# Patient Record
Sex: MALE | Race: AMERICAN INDIAN OR ALASKA NATIVE | URBAN - METROPOLITAN AREA
[De-identification: names, ages, dates, MRNs, and addresses within clinical notes are randomized per-mention and may not be internally consistent; named-entity substitution may affect disease eponyms.]

---

## 2018-09-26 ENCOUNTER — OFFICE VISIT (OUTPATIENT)
Dept: FAMILY MEDICINE | Facility: CLINIC | Age: 28
End: 2018-09-26
Payer: MEDICAID

## 2018-09-26 VITALS
HEIGHT: 73 IN | RESPIRATION RATE: 16 BRPM | TEMPERATURE: 98.1 F | BODY MASS INDEX: 30.35 KG/M2 | WEIGHT: 229 LBS | DIASTOLIC BLOOD PRESSURE: 84 MMHG | SYSTOLIC BLOOD PRESSURE: 134 MMHG | OXYGEN SATURATION: 97 % | HEART RATE: 94 BPM

## 2018-09-26 DIAGNOSIS — Z23 NEED FOR DIPHTHERIA-TETANUS-PERTUSSIS (TDAP) VACCINE: ICD-10-CM

## 2018-09-26 DIAGNOSIS — K92.1 BLOOD IN STOOL: ICD-10-CM

## 2018-09-26 DIAGNOSIS — F17.210 CIGARETTE NICOTINE DEPENDENCE WITHOUT COMPLICATION: ICD-10-CM

## 2018-09-26 DIAGNOSIS — Z23 NEED FOR INFLUENZA VACCINATION: ICD-10-CM

## 2018-09-26 DIAGNOSIS — K64.8 INTERNAL HEMORRHOID: ICD-10-CM

## 2018-09-26 DIAGNOSIS — M79.672 LEFT FOOT PAIN: ICD-10-CM

## 2018-09-26 DIAGNOSIS — Z00.00 ROUTINE GENERAL MEDICAL EXAMINATION AT A HEALTH CARE FACILITY: Primary | ICD-10-CM

## 2018-09-26 DIAGNOSIS — F19.10 SUBSTANCE ABUSE (H): ICD-10-CM

## 2018-09-26 ASSESSMENT — ANXIETY QUESTIONNAIRES
6. BECOMING EASILY ANNOYED OR IRRITABLE: SEVERAL DAYS
1. FEELING NERVOUS, ANXIOUS, OR ON EDGE: SEVERAL DAYS
GAD7 TOTAL SCORE: 7
2. NOT BEING ABLE TO STOP OR CONTROL WORRYING: SEVERAL DAYS
5. BEING SO RESTLESS THAT IT IS HARD TO SIT STILL: NOT AT ALL
7. FEELING AFRAID AS IF SOMETHING AWFUL MIGHT HAPPEN: MORE THAN HALF THE DAYS
IF YOU CHECKED OFF ANY PROBLEMS ON THIS QUESTIONNAIRE, HOW DIFFICULT HAVE THESE PROBLEMS MADE IT FOR YOU TO DO YOUR WORK, TAKE CARE OF THINGS AT HOME, OR GET ALONG WITH OTHER PEOPLE: VERY DIFFICULT
3. WORRYING TOO MUCH ABOUT DIFFERENT THINGS: SEVERAL DAYS

## 2018-09-26 ASSESSMENT — PATIENT HEALTH QUESTIONNAIRE - PHQ9: 5. POOR APPETITE OR OVEREATING: SEVERAL DAYS

## 2018-09-26 NOTE — PATIENT INSTRUCTIONS
1. Routine general medical examination at a health care facility  Declined routine STI screening, HIV, HCV and renal function/liver function/electrolytes while in clinic today. Referral for Rani Hernandez RN to schedule annual dental examination.   - TDAP VACCINE (BOOSTRIX)  - ADMIN VACCINE, INITIAL  - Pneumococcal vaccine 23 valent PPSV23  (Pneumovax) [[id number]]  - FLU VACCINE, 3 YRS +, IM  - ADMIN VACCINE, EACH ADDITIONAL    2. Cigarette nicotine dependence without complication  - nicotine polacrilex (CVS NICOTINE) 2 MG gum; Place 1 each (2 mg) inside cheek as needed for smoking cessation  Dispense: 30 tablet; Refill: 1  - Pneumococcal vaccine 23 valent PPSV23  (Pneumovax) [[id number]]    3. Substance abuse  Continue with sobriety care at [facility].     4. Left foot pain  - PODIATRY/FOOT & ANKLE SURGERY REFERRAL    5. Need for diphtheria-tetanus-pertussis (Tdap) vaccine  - TDAP VACCINE (BOOSTRIX)    6. Blood in stool  - Fecal colorectal cancer screen FITT; Future    7. Internal hemorrhoid  Increase both dietary fiber and water intake. Limit the time on the toilet to just when you need to be there; no sitting longer than needed. Can consider rectal medication to decrease inflammation of hemorrhoid and/or stool softener to help as well if needed.

## 2018-09-26 NOTE — MR AVS SNAPSHOT
After Visit Summary   9/26/2018    Carrington Johnson    MRN: [id number]           Patient Information     Date Of Birth          1990        Visit Information        Provider Department      9/26/2018 8:00 AM Rosmery Dumont APRN [facility]        Today's Diagnoses     Routine general medical examination at a health care facility    -  1    Cigarette nicotine dependence without complication        Substance abuse        Left foot pain        Need for diphtheria-tetanus-pertussis (Tdap) vaccine        Blood in stool        Internal hemorrhoid          Care Instructions    1. Routine general medical examination at a health care facility  Declined routine STI screening, HIV, HCV and renal function/liver function/electrolytes while in clinic today. Referral for Rani Hernandez RN to schedule annual dental examination.   - TDAP VACCINE (BOOSTRIX)  - ADMIN VACCINE, INITIAL  - Pneumococcal vaccine 23 valent PPSV23  (Pneumovax) [[id number]]  - FLU VACCINE, 3 YRS +, IM  - ADMIN VACCINE, EACH ADDITIONAL    2. Cigarette nicotine dependence without complication  - nicotine polacrilex (CVS NICOTINE) 2 MG gum; Place 1 each (2 mg) inside cheek as needed for smoking cessation  Dispense: 30 tablet; Refill: 1  - Pneumococcal vaccine 23 valent PPSV23  (Pneumovax) [[id number]]    3. Substance abuse  Continue with sobriety care at [facility].     4. Left foot pain  - PODIATRY/FOOT & ANKLE SURGERY REFERRAL    5. Need for diphtheria-tetanus-pertussis (Tdap) vaccine  - TDAP VACCINE (BOOSTRIX)    6. Blood in stool  - Fecal colorectal cancer screen FITT; Future    7. Internal hemorrhoid  Increase both dietary fiber and water intake. Limit the time on the toilet to just when you need to be there; no sitting longer than needed. Can consider rectal medication to decrease inflammation of hemorrhoid and/or stool softener to help as well if needed.             Follow-ups after your visit        Additional Services      PODIATRY/FOOT & ANKLE SURGERY REFERRAL       Your provider has referred you to: SHAHEED: [facility]: [address] [place] [id number] Patient Scheduling Line: [phone] option 1 (scheduling and directions)    Please be aware that coverage of these services is subject to the terms and limitations of your health insurance plan.  Call member services at your health plan with any benefit or coverage questions.      Please bring the following to your appointment:  >>   Any x-rays, CTs or MRIs which have been performed.  Contact the facility where they were done to arrange for  prior to your scheduled appointment.    >>   List of current medications   >>   This referral request   >>   Any documents/labs given to you for this referral                  Future tests that were ordered for you today     Open Future Orders        Priority Expected Expires Ordered    Fecal colorectal cancer screen FITT Routine 10/17/2018 2018 2018            Who to contact     Please call your clinic at [phone] to:    Ask questions about your health    Make or cancel appointments    Discuss your medicines    Learn about your test results    Speak to your doctor            Additional Information About Your Visit        [employer] Information     Clickatell is an electronic gateway that provides easy, online access to your medical records. With Clickatell, you can request a clinic appointment, read your test results, renew a prescription or communicate with your care team.     To sign up for [employer] visit the website at [email or web address]   You will be asked to enter the access code listed below, as well as some personal information. Please follow the directions to create your username and password.     Your access code is: [email or web address]  Expires: 2018  9:16 AM     Your access code will  in 90 days. If you need help or a new code, please contact your [facility] or call  "[phone] for assistance.        Care EveryWhere ID     This is your Care EveryWhere ID. This could be used by other organizations to access your [facility] medical records  [id number]        Your Vitals Were     Pulse Temperature Respirations Height Pulse Oximetry BMI (Body Mass Index)    94 98.1  F (36.7  C) (Oral) 16 6' 1\" (185.4 cm) 97% 30.21 kg/m2       Blood Pressure from Last 3 Encounters:   09/26/18 134/84    Weight from Last 3 Encounters:   09/26/18 229 lb (103.9 kg)              We Performed the Following     ADMIN VACCINE, EACH ADDITIONAL     ADMIN VACCINE, INITIAL     FLU VACCINE, 3 YRS +, IM     Pneumococcal vaccine 23 valent PPSV23  (Pneumovax) [[id number]]     PODIATRY/FOOT & ANKLE SURGERY REFERRAL     TDAP VACCINE (BOOSTRIX)          Today's Medication Changes          These changes are accurate as of 9/26/18  9:16 AM.  If you have any questions, ask your nurse or doctor.               Start taking these medicines.        Dose/Directions    nicotine polacrilex 2 MG gum   Commonly known as:  CVS NICOTINE   Used for:  Cigarette nicotine dependence without complication   Started by:  Rosmery Dumont APRN CNP        Dose:  2 mg   Place 1 each (2 mg) inside cheek as needed for smoking cessation   Quantity:  30 tablet   Refills:  1            Where to get your medicines      These medications were sent to [facility] [place] [facility] - [place], [place] - [address]  [facility] [address], [place] [address]     Phone:  [phone]     nicotine polacrilex 2 MG gum                Primary Care Provider    None Specified       No primary provider on file.        Equal Access to Services     REG[place] CALL AH: Shanice Chappell, bethany vyas, qaybta kasheron zhang. So [facility] [phone].    ATENCIÓN: Si habla español, tiene a hood disposición servicios gratuitos de asistencia lingüística. Llame al [phone].    We comply with " applicable federal civil rights laws and [place] laws. We do not discriminate on the basis of race, color, national origin, age, disability, sex, sexual orientation, or gender identity.            Thank you!     Thank you for choosing [facility]  for your care. Our goal is always to provide you with excellent care. Hearing back from our patients is one way we can continue to improve our services. Please take a few minutes to complete the written survey that you may receive in the mail after your visit with us. Thank you!             Your Updated Medication List - Protect others around you: Learn how to safely use, store and throw away your medicines at www.disposemymeds.org.          This list is accurate as of 9/26/18  9:16 AM.  Always use your most recent med list.                   Brand Name Dispense Instructions for use Diagnosis    nicotine polacrilex 2 MG gum    CVS NICOTINE    30 tablet    Place 1 each (2 mg) inside cheek as needed for smoking cessation    Cigarette nicotine dependence without complication

## 2018-09-26 NOTE — NURSING NOTE
"28 year old  Chief Complaint   Patient presents with     Physical     Pt. presents to the clinic today for a physical exam.        Blood pressure 134/84, pulse 94, temperature 98.1  F (36.7  C), temperature source Oral, resp. rate 16, height 6' 1\" (185.4 cm), weight 229 lb (103.9 kg), SpO2 97 %. Body mass index is 30.21 kg/(m^2).  BP completed using cuff size:    There is no problem list on file for this patient.      Wt Readings from Last 2 Encounters:   09/26/18 229 lb (103.9 kg)     BP Readings from Last 3 Encounters:   09/26/18 134/84       No Known Allergies    No current outpatient prescriptions on file.     No current facility-administered medications for this visit.        Social History   Substance Use Topics     Smoking status: Current Every Day Smoker     Types: Cigarettes     Smokeless tobacco: Never Used     Alcohol use No         Honoring Choices - Health Care Directive Guide offered to patient at time of visit.    Health Maintenance Due   Topic Date Due     PHQ-2 Q1 YR  02/04/2003     TETANUS IMMUNIZATION (SYSTEM ASSIGNED)  02/04/2009     HIV SCREEN (SYSTEM ASSIGNED)  02/04/2009     INFLUENZA VACCINE (1) 09/01/2018         There is no immunization history on file for this patient.    No results found for: PAP      No lab results found.    PHQ-2 ( 1999 Pfizer) 9/26/2018   Q1: Little interest or pleasure in doing things 0   Q2: Feeling down, depressed or hopeless 1   PHQ-2 Score 1       PHQ-9 SCORE 9/26/2018   Total Score 3       LIZA-7 SCORE 9/26/2018   Total Score 7     Screening Questionnaire for Adult Immunization    Are you sick today?   No   Do you have allergies to medications, food, a vaccine component or latex?   No   Have you ever had a serious reaction after receiving a vaccination?   No   Do you have a long-term health problem with heart disease, lung disease, asthma, kidney disease, metabolic disease (e.g. diabetes), anemia, or other blood disorder?   No   Do you have cancer, leukemia, " HIV/AIDS, or any other immune system problem?   No   In the past 3 months, have you taken medications that affect  your immune system, such as prednisone, other steroids, or anticancer drugs; drugs for the treatment of rheumatoid arthritis, Crohn s disease, or psoriasis; or have you had radiation treatments?   No   Have you had a seizure, or a brain or other nervous system problem?   No   During the past year, have you received a transfusion of blood or blood     products, or been given immune (gamma) globulin or antiviral drug?   No   For women: Are you pregnant or is there a chance you could become        pregnant during the next month?   No   Have you received any vaccinations in the past 4 weeks?   No     Immunization questionnaire answers were all negative.        Per orders of Rosmery Dumont NP, injection of Pneumococcal, Influenza, TDap given by Mirta Chacko. Patient instructed to remain in clinic for 15 minutes afterwards, and to report any adverse reaction to me immediately.       Screening performed by Mirta Chacko on 9/26/2018 at 9:44 AM.    No flowsheet data found.    Mirta Chacko CMA  September 26, 2018 8:14 AM
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
## 2018-09-27 ASSESSMENT — PATIENT HEALTH QUESTIONNAIRE - PHQ9: SUM OF ALL RESPONSES TO PHQ QUESTIONS 1-9: 3

## 2018-09-27 ASSESSMENT — ANXIETY QUESTIONNAIRES: GAD7 TOTAL SCORE: 7

## 2018-10-03 NOTE — TELEPHONE ENCOUNTER
FUTURE VISIT INFORMATION      FUTURE VISIT INFORMATION:    Date: 10/8/18    Time:     Location: [facility]  REFERRAL INFORMATION:    Referring provider:      Referring providers clinic:      Reason for visit/diagnosis  L foot pain, no previous surgery or outside records, appt per [facility]    RECORDS REQUESTED FROM:       Clinic name Comments Records Status Imaging Status    Treatment center referring n/a n/a                                   RECORDS STATUS

## 2018-10-08 ENCOUNTER — RADIANT APPOINTMENT (OUTPATIENT)
Dept: GENERAL RADIOLOGY | Facility: CLINIC | Age: 28
End: 2018-10-08
Attending: FAMILY MEDICINE
Payer: COMMERCIAL

## 2018-10-08 ENCOUNTER — PRE VISIT (OUTPATIENT)
Dept: ORTHOPEDICS | Facility: CLINIC | Age: 28
End: 2018-10-08

## 2018-10-08 ENCOUNTER — OFFICE VISIT (OUTPATIENT)
Dept: ORTHOPEDICS | Facility: CLINIC | Age: 28
End: 2018-10-08
Payer: COMMERCIAL

## 2018-10-08 VITALS — HEIGHT: 73 IN | WEIGHT: 229 LBS | RESPIRATION RATE: 16 BRPM | BODY MASS INDEX: 30.35 KG/M2

## 2018-10-08 DIAGNOSIS — M20.12 HALLUX VALGUS (ACQUIRED), LEFT FOOT: Primary | ICD-10-CM

## 2018-10-08 DIAGNOSIS — M79.672 LEFT FOOT PAIN: ICD-10-CM

## 2018-10-08 NOTE — LETTER
"  10/8/2018      RE: Carrington Johnson  [address] [facility] [address]        Subjective:   Carrington Johnson is a 28 year old male who presents with left medial foot pain. The pt reports a possibleinjury when he was 6 y/o. He believes a bunion like deformity occurred because he had no treatment at the time. Pain has been persistent.    He has a hx of heroin use which has been in remission since July 2017. He was incarcerated and then released and is now in inpatient drug treatment. He is attempting to take care of underlying issues so he can achieve employment when done with treatment.      Background:   Date of injury: None  Duration of symptoms: years intermittently  Mechanism of Injury: Chronic; Unknown   Intensity: 10/10 at the worst ; 6/10 at rest  Aggravating factors: Walking, running, jumping   Relieving Factors: Massage  Prior Evaluation: None    PAST MEDICAL, SOCIAL, SURGICAL AND FAMILY HISTORY: He  has a past medical history of Substance abuse (H).  He  has a past surgical history that includes wisdom teeth extraction.  His family history includes Allergy (Severe) in his son; Back Pain in his father; Breast Cancer in his maternal grandmother; Cancer in his paternal grandfather; Chronic Obstructive Pulmonary Disease in his paternal grandmother; Eczema in his son; No Known Problems in his brother, brother, maternal grandfather, mother, sister, and sister; Ovarian Cancer in his maternal grandmother.  He reports that he has been smoking Cigarettes.  He has never used smokeless tobacco. He reports that he uses illicit drugs, including Methamphetamines and Opiates. He reports that he does not drink alcohol.    ALLERGIES: He has No Known Allergies.    CURRENT MEDICATIONS: He has a current medication list which includes the following prescription(s): nicotine polacrilex.     REVIEW OF SYSTEMS: 3 point review of systems is negative except as noted above.     Exam:   Resp 16  Ht 6' 1\" (1.854 m)  Wt 229 lb " (103.9 kg)  BMI 30.21 kg/m2     CONSTITUTIONAL: healthy, alert and no distress  HEAD: Normocephalic. No masses, lesions, tenderness or abnormalities  SKIN: no suspicious lesions or rashes  GAIT: normal  NEUROLOGIC: Non-focal  PSYCHIATRIC: affect normal/bright and mentation appears normal.    MUSCULOSKELETAL:   L great toe   Hallux valgus deformity noted  Tender at the medial MTP, joint is reducible.  nontender at the sesamoids or midfoot. Nl sensation and cap refill    Xray Left foot consistent with hallux valgus deformity, no underlying bony lesion noted.     Assessment/Plan:   Left Hallux Valgus deformity and pain  --perhaps post-traumatic in childhood  --we discussed ice, nsiads, shoes with wide footbox  --rx for DME hallux valgus splint use  --given the length of time and symptoms, he is interested in talking to podiatry about this issue to know about potential surgical options now, or in the future.    Nick Ken MD CAQ      Nick Ken MD

## 2018-10-08 NOTE — PATIENT INSTRUCTIONS
Left Hallux valgus  Acetaminophen  Ice  Wide shoe  Trial a splint, if not improved f/u in podiatry to discuss surgical options.      Nick Ken MD CAQ

## 2018-10-08 NOTE — PROGRESS NOTES
" Subjective:   Carrington Johnson is a 28 year old male who presents with left medial foot pain. The pt reports a possibleinjury when he was 6 y/o. He believes a bunion like deformity occurred because he had no treatment at the time. Pain has been persistent.    He has a hx of heroin use which has been in remission since July 2017. He was incarcerated and then released and is now in inpatient drug treatment. He is attempting to take care of underlying issues so he can achieve employment when done with treatment.      Background:   Date of injury: None  Duration of symptoms: years intermittently  Mechanism of Injury: Chronic; Unknown   Intensity: 10/10 at the worst ; 6/10 at rest  Aggravating factors: Walking, running, jumping   Relieving Factors: Massage  Prior Evaluation: None    PAST MEDICAL, SOCIAL, SURGICAL AND FAMILY HISTORY: He  has a past medical history of Substance abuse (H).  He  has a past surgical history that includes wisdom teeth extraction.  His family history includes Allergy (Severe) in his son; Back Pain in his father; Breast Cancer in his maternal grandmother; Cancer in his paternal grandfather; Chronic Obstructive Pulmonary Disease in his paternal grandmother; Eczema in his son; No Known Problems in his brother, brother, maternal grandfather, mother, sister, and sister; Ovarian Cancer in his maternal grandmother.  He reports that he has been smoking Cigarettes.  He has never used smokeless tobacco. He reports that he uses illicit drugs, including Methamphetamines and Opiates. He reports that he does not drink alcohol.    ALLERGIES: He has No Known Allergies.    CURRENT MEDICATIONS: He has a current medication list which includes the following prescription(s): nicotine polacrilex.     REVIEW OF SYSTEMS: 3 point review of systems is negative except as noted above.     Exam:   Resp 16  Ht 6' 1\" (1.854 m)  Wt 229 lb (103.9 kg)  BMI 30.21 kg/m2     CONSTITUTIONAL: healthy, alert and no distress  HEAD: " Normocephalic. No masses, lesions, tenderness or abnormalities  SKIN: no suspicious lesions or rashes  GAIT: normal  NEUROLOGIC: Non-focal  PSYCHIATRIC: affect normal/bright and mentation appears normal.    MUSCULOSKELETAL:   L great toe   Hallux valgus deformity noted  Tender at the medial MTP, joint is reducible.  nontender at the sesamoids or midfoot. Nl sensation and cap refill    Xray Left foot consistent with hallux valgus deformity, no underlying bony lesion noted.     Assessment/Plan:   Left Hallux Valgus deformity and pain  --perhaps post-traumatic in childhood  --we discussed ice, nsiads, shoes with wide footbox  --rx for DME hallux valgus splint use  --given the length of time and symptoms, he is interested in talking to podiatry about this issue to know about potential surgical options now, or in the future.    Nick Ken MD CAQ

## 2018-10-08 NOTE — MR AVS SNAPSHOT
After Visit Summary   10/8/2018    Carrington Johnson    MRN: [id number]           Patient Information     Date Of Birth          1990        Visit Information        Provider Department      10/8/2018 3:45 PM Nick Ken MD [facility]        Today's Diagnoses     Hallux valgus (acquired), left foot    -  1    Left foot pain          Care Instructions    Left Hallux valgus  Acetaminophen  Ice  Wide shoe  Trial a splint, if not improved f/u in podiatry to discuss surgical options.      Nick Ken MD CAQ            Follow-ups after your visit        Additional Services     DME REFERRAL       Left Hallux Valgus splint    Please be aware that coverage of these services is subject to the terms and limitations of your health insurance plan.  Call member services at your health plan with any benefit or coverage questions.      Please bring the following to your appointment:  Any x-rays, CTs or MRIs which have been performed.  Contact the facility where they were done to arrange for  prior to your scheduled appointment.    List of current medications   This referral request   Any documents/labs given to you for this referral            PODIATRY/FOOT & ANKLE SURGERY REFERRAL       Your provider has referred you to:  Podiatry to discuss surgical options for Left Hallux Valgus     Please be aware that coverage of these services is subject to the terms and limitations of your health insurance plan.  Call member services at your health plan with any benefit or coverage questions.      Please bring the following to your appointment:  >>   Any x-rays, CTs or MRIs which have been performed.  Contact the facility where they were done to arrange for  prior to your scheduled appointment.    >>   List of current medications   >>   This referral request   >>   Any documents/labs given to you for this referral                  Your next 10 appointments already scheduled     Nov  "2018  4:20 PM CST   (Arrive by 4:05 PM)   NEW FOOT/ANKLE with Ryan Tinoco DPM   [facility] ([facility] [place])    [address] [address] [phone]              Who to contact     Please call your clinic at [phone] to:    Ask questions about your health    Make or cancel appointments    Discuss your medicines    Learn about your test results    Speak to your doctor            Additional Information About Your Visit        AeroDronhart Information     PeopleJar is an electronic gateway that provides easy, online access to your medical records. With PeopleJar, you can request a clinic appointment, read your test results, renew a prescription or communicate with your care team.     To sign up for PeopleJar visit the website at [email or web address]   You will be asked to enter the access code listed below, as well as some personal information. Please follow the directions to create your username and password.     Your access code is: [email or web address]  Expires: 2018  9:16 AM     Your access code will  in 90 days. If you need help or a new code, please contact your [facility] or call [phone] for assistance.        Care EveryWhere ID     This is your Care EveryWhere ID. This could be used by other organizations to access your [place] medical records  [id number]        Your Vitals Were     Respirations Height BMI (Body Mass Index)             16 6' 1\" (1.854 m) 30.21 kg/m2          Blood Pressure from Last 3 Encounters:   18 134/84    Weight from Last 3 Encounters:   10/08/18 229 lb (103.9 kg)   18 229 lb (103.9 kg)              We Performed the Following     DME REFERRAL     PODIATRY/FOOT & ANKLE SURGERY REFERRAL        Primary Care Provider    None Specified       No primary provider on file.        Equal Access to Services     FREDERICK CALL AH: Shanice Chappell, " bethany vyas, marah niidemi smith, sheron brizuela sallytl chaconaalubna ah. So [facility] [phone].    ATENCIÓN: Si dejala socrates, tiene a hood disposición servicios gratuitos de asistencia lingüística. Llame al [phone].    We comply with applicable federal civil rights laws and [place] laws. We do not discriminate on the basis of race, color, national origin, age, disability, sex, sexual orientation, or gender identity.            Thank you!     Thank you for choosing [facility]  for your care. Our goal is always to provide you with excellent care. Hearing back from our patients is one way we can continue to improve our services. Please take a few minutes to complete the written survey that you may receive in the mail after your visit with us. Thank you!             Your Updated Medication List - Protect others around you: Learn how to safely use, store and throw away your medicines at www.disposemymeds.org.          This list is accurate as of 10/8/18  4:27 PM.  Always use your most recent med list.                   Brand Name Dispense Instructions for use Diagnosis    nicotine polacrilex 2 MG gum    [facility] NICOTINE    30 tablet    Place 1 each (2 mg) inside cheek as needed for smoking cessation    Cigarette nicotine dependence without complication

## 2018-10-08 NOTE — LETTER
Date:October 9, 2018      Patient was self referred, no letter generated. Do not send.        [facility] Information

## 2018-11-01 NOTE — TELEPHONE ENCOUNTER
FUTURE VISIT INFORMATION      FUTURE VISIT INFORMATION:    Date: 11/13/18    Time: 1620    Location: ORTHO  REFERRAL INFORMATION:    Referring provider:  DR ORNELAS    Referring providers clinic:  [facility]    Reason for visit/diagnosis  L FOOT PAIN     RECORDS REQUESTED FROM:       Clinic name Comments Records Status Imaging Status                                         RECORDS IN CHART FROM [facility]

## 2018-11-13 ENCOUNTER — PRE VISIT (OUTPATIENT)
Dept: ORTHOPEDICS | Facility: CLINIC | Age: 28
End: 2018-11-13

## 2018-11-13 ENCOUNTER — OFFICE VISIT (OUTPATIENT)
Dept: ORTHOPEDICS | Facility: CLINIC | Age: 28
End: 2018-11-13
Payer: COMMERCIAL

## 2018-11-13 DIAGNOSIS — M79.672 LEFT FOOT PAIN: ICD-10-CM

## 2018-11-13 DIAGNOSIS — M20.12 HALLUX VALGUS OF LEFT FOOT: Primary | ICD-10-CM

## 2018-11-13 RX ORDER — IBUPROFEN 600 MG/1
600 TABLET, FILM COATED ORAL
COMMUNITY
Start: 2018-10-25

## 2018-11-13 RX ORDER — FLUTICASONE PROPIONATE 50 MCG
2 SPRAY, SUSPENSION (ML) NASAL
COMMUNITY
Start: 2018-10-25

## 2018-11-13 ASSESSMENT — ENCOUNTER SYMPTOMS
BACK PAIN: 1
NERVOUS/ANXIOUS: 1
DECREASED CONCENTRATION: 1
NECK PAIN: 1
DEPRESSION: 1

## 2018-11-13 NOTE — MR AVS SNAPSHOT
After Visit Summary   11/13/2018    Carrington Johnson    MRN: [id number]           Patient Information     Date Of Birth          1990        Visit Information        Provider Department      11/13/2018 4:20 PM Ryan Tinoco DPM [facility]        Today's Diagnoses     Hallux valgus of left foot    -  1    Left foot pain           Follow-ups after your visit        Additional Services     ORTHOPEDICS ADULT REFERRAL       Your provider has referred you to: [facility]: Orthopaedic Clinic [facility] [phone]   [email or web address]      Dr. Liz    Please be aware that coverage of these services is subject to the terms and limitations of your health insurance plan.  Call member services at your health plan with any benefit or coverage questions.      Please bring the following to your appointment:    >>   Any x-rays, CTs or MRIs which have been performed.  Contact the facility where they were done to arrange for  prior to your scheduled appointment.    >>   List of current medications   >>   This referral request   >>   Any documents/labs given to you for this referral                  Your next 10 appointments already scheduled     Dec 04, 2018  7:20 AM CST   (Arrive by 7:05 AM)   NEW FOOT/ANKLE with Calixto Liz MD   [facility] ([facility] [place])    [address] [place] [address] [phone]              Who to contact     Please call your clinic at [phone] to:    Ask questions about your health    Make or cancel appointments    Discuss your medicines    Learn about your test results    Speak to your doctor            Additional Information About Your Visit        MyChart Information     Sk[employer] is an electronic gateway that provides easy, online access to your medical records. With [employer], you can request a clinic appointment, read your test results, renew  a prescription or communicate with your care team.     To sign up for Heatmapshart visit the website at [email or web address]   You will be asked to enter the access code listed below, as well as some personal information. Please follow the directions to create your username and password.     Your access code is: [email or web address]  Expires: 2018  8:16 AM     Your access code will  in 90 days. If you need help or a new code, please contact your [facility] or call [phone] for assistance.        Care EveryWhere ID     This is your Care EveryWhere ID. This could be used by other organizations to access your [place] medical records  [id number]         Blood Pressure from Last 3 Encounters:   18 134/84    Weight from Last 3 Encounters:   10/08/18 103.9 kg (229 lb)   18 103.9 kg (229 lb)              We Performed the Following     ORTHOPEDICS ADULT REFERRAL        Primary Care Provider    None Specified       No primary provider on file.        Equal Access to Services     [facility]: Hadii aad ku hadasho Soomaali, waaxda luqadaha, qaybta kaalmada adeegyada, sheron li . So [facility] [phone].    ATENCIÓN: Si habla español, tiene a hood disposición servicios gratuitos de asistencia lingüística. Llame al [phone].    We comply with applicable federal civil rights laws and [place] laws. We do not discriminate on the basis of race, color, national origin, age, disability, sex, sexual orientation, or gender identity.            Thank you!     Thank you for choosing [facility]  for your care. Our goal is always to provide you with excellent care. Hearing back from our patients is one way we can continue to improve our services. Please take a few minutes to complete the written survey that you may receive in the mail after your visit with us. Thank you!             Your Updated Medication List - Protect others around  you: Learn how to safely use, store and throw away your medicines at www.disposemymeds.org.          This list is accurate as of 11/13/18 11:59 PM.  Always use your most recent med list.                   Brand Name Dispense Instructions for use Diagnosis    fluticasone 50 MCG/ACT spray    FLONASE     Spray 2 sprays in nostril        ibuprofen 600 MG tablet    ADVIL/MOTRIN     Take 600 mg by mouth        nicotine polacrilex 2 MG gum    CVS NICOTINE    30 tablet    Place 1 each (2 mg) inside cheek as needed for smoking cessation    Cigarette nicotine dependence without complication

## 2018-11-13 NOTE — NURSING NOTE
Reason For Visit:   Chief Complaint   Patient presents with     RECHECK     Pain, left foot. Patient stated that his foot has been like this since he was 5 years old.        Referring:   DAVIDE ORNELAS    Pain Assessment  Patient Currently in Pain: Denies  Primary Pain Location: Foot  Pain Orientation: Left  Pain Descriptors:  (intermittently flares up. )               Current Outpatient Prescriptions   Medication Sig Dispense Refill     fluticasone (FLONASE) 50 MCG/ACT spray Spray 2 sprays in nostril       ibuprofen (ADVIL/MOTRIN) 600 MG tablet Take 600 mg by mouth       nicotine polacrilex ([facility]S NICOTINE) 2 MG gum Place 1 each (2 mg) inside cheek as needed for smoking cessation (Patient not taking: Reported on 10/8/2018) 30 tablet 1        No Known Allergies

## 2018-11-13 NOTE — LETTER
11/13/2018       RE: Carrington Johnson  [address] [facility] [address]     Dear Colleague,    Thank you for referring your patient, Carrington Johnson, [facility] the [facility]. Please see a copy of my visit note below.      Date of Service: 11/13/2018    Chief Complaint:   Chief Complaint   Patient presents with     RECHECK     Pain, left foot. Patient stated that his foot has been like this since he was 5 years old.         HPI: Carrington is a 28 year old male who presents today for further evaluation of left foot bunion.  Nature: sharp/dull/aching    Location: left foot bunion    Duration: since he was 5. Pain for the last year or so.     Onset: thinks this started after trauma to the foot at age 5. Had a door shut on it.     Course: worsening    Aggravating/alleviating factors: walking and shoes aggravate. Rest alleviates.    Previous Treatments: None      Review of Systems: Answers for HPI/ROS submitted by the patient on 11/13/2018   General Symptoms: No  Skin Symptoms: No  HENT Symptoms: Yes  EYE SYMPTOMS: No  HEART SYMPTOMS: No  LUNG SYMPTOMS: No  INTESTINAL SYMPTOMS: No  URINARY SYMPTOMS: No  REPRODUCTIVE SYMPTOMS: No  SKELETAL SYMPTOMS: Yes  BLOOD SYMPTOMS: No  NERVOUS SYSTEM SYMPTOMS: No  MENTAL HEALTH SYMPTOMS: Yes  Ear pain: Yes  Tinnitus: Yes  Tooth pain: Yes  Back pain: Yes  Neck pain: Yes  Nervous or Anxious: Yes  Depression: Yes  Trouble thinking or concentrating: Yes  Mood changes: Yes      PMH:   Past Medical History:   Diagnosis Date     Substance abuse (H)        PSxH:   Past Surgical History:   Procedure Laterality Date     wisdom teeth extraction         Allergies: Review of patient's allergies indicates no known allergies.    SH:   Social History     Social History     Marital status: Single     Spouse name: N/A     Number of children: N/A     Years of education: N/A     Occupational History     Not on file.     Social History Main  Topics     Smoking status: Current Every Day Smoker     Types: Cigarettes     Smokeless tobacco: Never Used     Alcohol use No     Drug use: Yes     Special: Methamphetamines, Opiates      Comment: clean since 7/11/2017     Sexual activity: Not Currently     Partners: Female     Other Topics Concern     Not on file     Social History Narrative    Has a son (23 months), and another son who is 6 years old. Has 5 step-children as well. Is [facility]. Is not planning on returning after discharge from [facility]. [place] is planning on moving down to [facility] once Carrington transitions to sober housing.        FH:   Family History   Problem Relation Age of Onset     No Known Problems Mother      Back Pain Father      slipped discs     No Known Problems Sister      No Known Problems Brother      not in contact with oldest sibling     Breast Cancer Maternal Grandmother      Ovarian Cancer Maternal Grandmother      No Known Problems Maternal Grandfather      Chronic Obstructive Pulmonary Disease Paternal Grandmother      Cancer Paternal Grandfather      No Known Problems Sister      No Known Problems Brother      Allergy (Severe) Son      Peanut allergy     Eczema Son        Objective:  Data Unavailable Data Unavailable Data Unavailable Data Unavailable Data Unavailable 0 lbs 0 oz    PT and DP pulses are 2/4 bilaterally. CRT is instant. Positive pedal hair.   Gross sensation is intact bilaterally.   Equinus is mild bilaterally. Severe HAV deformity noted on the left foot that is tracking and on the verge of trackbound. Irritation erythema noted to the medial prominence with associated bursa noted. No over or underriding of the 2nd digit.    Nails normal bilaterally. No open lesions are noted.     left foot xrays indicated in 3 weightbearing views.    hallux valgus deformity with JOANA of 18 and HAA of 48. Tibial sesamoid position at 7.         Assessment: Severe HAV on  the left foot causing pain.     Plan:  - Pt seen and evaluated  - Xrays taken and discussed with him.  - Recommend that he try a bunion sleeve on thwe area for some conservative padding. However, I think he is likely to fail conservative tx due to the extent of the deformity. As such, he would like to speak to Dr. Liz about surgical correction. Referral was written.  - See again PRN.              Again, thank you for allowing me to participate in the care of your patient.      Sincerely,    Ryan Tionco DPM

## 2018-11-13 NOTE — PROGRESS NOTES
Date of Service: 11/13/2018    Chief Complaint:   Chief Complaint   Patient presents with     RECHECK     Pain, left foot. Patient stated that his foot has been like this since he was 5 years old.         HPI: Carrington is a 28 year old male who presents today for further evaluation of left foot bunion.  Nature: sharp/dull/aching    Location: left foot bunion    Duration: since he was 5. Pain for the last year or so.     Onset: thinks this started after trauma to the foot at age 5. Had a door shut on it.     Course: worsening    Aggravating/alleviating factors: walking and shoes aggravate. Rest alleviates.    Previous Treatments: None      Review of Systems: Answers for HPI/ROS submitted by the patient on 11/13/2018   General Symptoms: No  Skin Symptoms: No  HENT Symptoms: Yes  EYE SYMPTOMS: No  HEART SYMPTOMS: No  LUNG SYMPTOMS: No  INTESTINAL SYMPTOMS: No  URINARY SYMPTOMS: No  REPRODUCTIVE SYMPTOMS: No  SKELETAL SYMPTOMS: Yes  BLOOD SYMPTOMS: No  NERVOUS SYSTEM SYMPTOMS: No  MENTAL HEALTH SYMPTOMS: Yes  Ear pain: Yes  Tinnitus: Yes  Tooth pain: Yes  Back pain: Yes  Neck pain: Yes  Nervous or Anxious: Yes  Depression: Yes  Trouble thinking or concentrating: Yes  Mood changes: Yes      PMH:   Past Medical History:   Diagnosis Date     Substance abuse (H)        PSxH:   Past Surgical History:   Procedure Laterality Date     wisdom teeth extraction         Allergies: Review of patient's allergies indicates no known allergies.    SH:   Social History     Social History     Marital status: Single     Spouse name: N/A     Number of children: N/A     Years of education: N/A     Occupational History     Not on file.     Social History Main Topics     Smoking status: Current Every Day Smoker     Types: Cigarettes     Smokeless tobacco: Never Used     Alcohol use No     Drug use: Yes     Special: Methamphetamines, Opiates      Comment: clean since 7/11/2017     Sexual activity: Not Currently     Partners: Female     Other  Topics Concern     Not on file     Social History Narrative    Has a son (23 months), and another son who is 6 years old. Has 5 step-children as well. Is from [place]. Is not planning on returning after discharge from [facility]. [place] is planning on moving down to [place] once Carrington transitions to sober housing.        FH:   Family History   Problem Relation Age of Onset     No Known Problems Mother      Back Pain Father      slipped discs     No Known Problems Sister      No Known Problems Brother      not in contact with oldest sibling     Breast Cancer Maternal Grandmother      Ovarian Cancer Maternal Grandmother      No Known Problems Maternal Grandfather      Chronic Obstructive Pulmonary Disease Paternal Grandmother      Cancer Paternal Grandfather      No Known Problems Sister      No Known Problems Brother      Allergy (Severe) Son      Peanut allergy     Eczema Son        Objective:  Data Unavailable Data Unavailable Data Unavailable Data Unavailable Data Unavailable 0 lbs 0 oz    PT and DP pulses are 2/4 bilaterally. CRT is instant. Positive pedal hair.   Gross sensation is intact bilaterally.   Equinus is mild bilaterally. Severe HAV deformity noted on the left foot that is tracking and on the verge of trackbound. Irritation erythema noted to the medial prominence with associated bursa noted. No over or underriding of the 2nd digit.    Nails normal bilaterally. No open lesions are noted.     left foot xrays indicated in 3 weightbearing views.    hallux valgus deformity with JOANA of 18 and HAA of 48. Tibial sesamoid position at 7.         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     Plan:  - Pt seen and evaluated  - Xrays taken and discussed with him.  - Recommend that he try a bunion sleeve on we area for some conservative padding. However, I think he is likely to fail conservative tx due to the extent of the deformity. As such, he  would like to speak to Dr. Liz about surgical correction. Referral was written.  - See again PRN.

## 2018-11-15 PROBLEM — M20.12 HALLUX VALGUS OF LEFT FOOT: Status: ACTIVE | Noted: 2018-11-15

## 2018-11-15 PROBLEM — M79.672 LEFT FOOT PAIN: Status: ACTIVE | Noted: 2018-11-15

## 2018-11-28 NOTE — TELEPHONE ENCOUNTER
FUTURE VISIT INFORMATION      FUTURE VISIT INFORMATION:    Date: 12/4/18    Time: 0720    Location: [facility]  REFERRAL INFORMATION:    Referring provider:  DR RIOS    Referring providers clinic:  ORTHO    Reason for visit/diagnosis  HALLUX VALGUS    RECORDS REQUESTED FROM:       Clinic name Comments Records Status Imaging Status                                         RECORDS IN CHART

## 2018-12-04 ENCOUNTER — OFFICE VISIT (OUTPATIENT)
Dept: ORTHOPEDICS | Facility: CLINIC | Age: 28
End: 2018-12-04
Payer: COMMERCIAL

## 2018-12-04 ENCOUNTER — PRE VISIT (OUTPATIENT)
Dept: ORTHOPEDICS | Facility: CLINIC | Age: 28
End: 2018-12-04

## 2018-12-04 VITALS
BODY MASS INDEX: 32.21 KG/M2 | WEIGHT: 251 LBS | SYSTOLIC BLOOD PRESSURE: 132 MMHG | DIASTOLIC BLOOD PRESSURE: 87 MMHG | HEIGHT: 74 IN

## 2018-12-04 VITALS — BODY MASS INDEX: 32.33 KG/M2 | WEIGHT: 251.9 LBS | HEIGHT: 74 IN

## 2018-12-04 DIAGNOSIS — M54.2 CERVICALGIA: Primary | ICD-10-CM

## 2018-12-04 DIAGNOSIS — M25.572 PAIN IN JOINT, ANKLE AND FOOT, LEFT: Primary | ICD-10-CM

## 2018-12-04 ASSESSMENT — ENCOUNTER SYMPTOMS
CONSTIPATION: 0
DISTURBANCES IN COORDINATION: 0
BOWEL INCONTINENCE: 0
NUMBNESS: 0
JOINT SWELLING: 0
MEMORY LOSS: 0
SPEECH CHANGE: 0
NERVOUS/ANXIOUS: 1
SORE THROAT: 0
MUSCLE CRAMPS: 0
TROUBLE SWALLOWING: 0
TASTE DISTURBANCE: 0
INSOMNIA: 0
BACK PAIN: 1
MYALGIAS: 0
NAUSEA: 0
LOSS OF CONSCIOUSNESS: 0
SINUS PAIN: 0
PARALYSIS: 0
WEAKNESS: 0
PANIC: 0
JAUNDICE: 0
MUSCLE WEAKNESS: 0
DECREASED CONCENTRATION: 1
DEPRESSION: 1
ABDOMINAL PAIN: 0
SMELL DISTURBANCE: 0
BLOOD IN STOOL: 1
NECK MASS: 0
VOMITING: 0
TINGLING: 0
STIFFNESS: 0
SINUS CONGESTION: 0
NECK PAIN: 1
RECTAL PAIN: 0
SEIZURES: 0
TREMORS: 0
HOARSE VOICE: 0
BLOATING: 0
DIZZINESS: 0
HEADACHES: 1
HEARTBURN: 0
ARTHRALGIAS: 0
DIARRHEA: 0

## 2018-12-04 NOTE — MR AVS SNAPSHOT
After Visit Summary   12/4/2018    Carrington Johnson    MRN: [id number]           Patient Information     Date Of Birth          1990        Visit Information        Provider Department      12/4/2018 7:40 AM Erlin Richards [facility]        Today's Diagnoses     Cervicalgia    -  1      Care Instructions    CERVICAL (Neck) STRAIN    WHAT IS NECK STRAIN?    A neck strain is a stretch or tear of a muscle in your neck.    WHAT IS THE CAUSE?    Neck strains usually happen when the head and neck are hit or forcibly moved, such as during rough contact sports or a whiplash injury (like a car accident or a fall) where your head and neck are snapped back and forth. Sometimes strains happen from an awkward position during sleep or poor posture while you work at a desk.    WHAT ARE THE SYMPTOMS?    The main symptom is pain in your neck. You may have pain when you move your head to the side or when you try to move your head up or down. The neck muscles may tighten (spasm) and feel hard and very tender to the touch. You may feel pain right after an injury or not until a few hours or days after the injury.    Other symptoms may include:    Stiff neck  Dizziness  Unusual feelings, like burning or a pins-and-needles feeling  Headache    HOW IS IT DIAGNOSED?    Your healthcare provider will ask about your symptoms, medical history, and activities and examine your neck. You may have X-rays to make sure the bones in your neck (vertebrae) are not injured.    HOW IS IT TREATED?    Your healthcare provider may recommend stretching and strengthening exercises and other types of physical therapy to help you heal.    The pain often gets better within a few weeks with self-care, but some injuries may take longer to heal. It s important to follow all of your healthcare provider s instructions.    HOW CAN I HELP TAKE CARE OF MYSELF?    To help relieve pain:    Put an ice pack, gel pack, or  package of frozen vegetables wrapped in a cloth on the injured area every 3 to 4 hours for up to 20 minutes at a time for the first day or two after the injury.    Take nonprescription pain medicine, such as acetaminophen, ibuprofen, or naproxen. Read the label and take as directed. Nonsteroidal anti-inflammatory medicines (NSAIDs), such as ibuprofen or naproxen, may cause stomach bleeding and other problems. These risks increase with age. Unless recommended by your healthcare provider, do not take an NSAID for more than 10 days.    Moist heat may help relax your muscles and make it easier to move your neck. Put moist heat on the sore area for 10 to 15 minutes before you do warm-up and stretching exercises. Moist heat includes heat patches or moist heating pads that you can purchase at most drugstores, a wet washcloth or towel that has been heated in a microwave or the dryer, or a hot shower. Don t use heat if you have swelling.    You may find that it helps to alternate putting heat and ice on your neck.    HOW CAN I HELP PREVENT NECK STRAIN?    Neck strain may be prevented by keeping your neck muscles strong and flexible. Ask your healthcare provider about stretching and exercises that may help. If you have a job or hobby that requires you to hold your neck in one position for long hours, like working at a computer all day, it s very important to take breaks and stretch your neck muscles.    Developed by [employer].  Published by [employer].  Copyright  2014 [employer] and/or one of its subsidiaries. All rights reserved.      Myofascial Release    Tennis balls can provide myofascial release through myofascial therapy. You can perform self-myofascial release by placing a tennis ball on the tender area and leaning against a wall or the floor to apply light pressure until you feel the release. Or you can put two tennis balls in a sock, leaving a small space between the balls, and tying the open end  closed. Lie on the floor and put the tennis balls behind your neck, one on either side of your spine. Press your neck against the tennis balls until you feel the release. Do not press hard enough to cause pain.    Neck Spasm Exercises    You may do all of these exercises right away but avoid any movements that increase your pain.    Neck rotation with flexion  Right side: Turn your head to the right and clasp your hands behind your head. Let the weight of your arms pull your chin to the right side of your chest. Relax. Hold for a count of 15. Do this 3 times.    Left side: Turn your head to the left and clasp your hands behind your head. Let the weight of your arms pull your chin to the left side of your chest. Relax. Hold for a count of 15. Do this 3 times.    Chin tuck: Place your fingertips on your chin and gently push your head straight back as if you are trying to make a double chin. Keep looking forward as your head moves back. Hold 5 seconds and repeat 5 times.  Scalene stretch: Sit or stand and clasp both hands behind your back. Lower your left shoulder and tilt your head toward the right until you feel a stretch. Hold this position for 15 to 30 seconds and then come back to the starting position. Then lower your right shoulder and tilt your head toward the left. Hold for 15 to 30 seconds. Repeat 3 times on each side.  Neck rotation stretch    Right side: Rotate your neck by looking over your right shoulder. Lift your right hand and place your palm on the left side of your chin. Push your chin with your palm toward your right shoulder. Hold for a count of 10. Do this 3 times.    Left side: Rotate your neck by looking over your left shoulder. Lift your left hand and place your palm on the right side of your chin. Push your chin with your palm toward your left shoulder. Hold for a count of 10. Do this 3 times.    Scapular squeeze: While sitting or standing with your arms by your sides, squeeze your shoulder  "blades together and hold for 5 seconds. Do 2 sets of 15.  Thoracic extension: Sit in a chair and clasp both arms behind your head. Gently arch backward and look up toward the ceiling. Repeat 10 times. Do this several times each day.    Head lift with neck curl: Lie on your back with your knees bent and your feet flat on the floor. Tuck your chin and lift your head about 3 inches off the floor, keeping your shoulders flat on the floor. Hold for 10 seconds. Repeat 5 times. Try to work up to holding for 20 to 30 seconds.              Follow-ups after your visit        Additional Services     PHYSICAL THERAPY REFERRAL (Internal)       Physical Therapy Referral                  Future tests that were ordered for you today     Open Future Orders        Priority Expected Expires Ordered    PHYSICAL THERAPY REFERRAL (Internal) Routine  12/4/2019 12/4/2018            Who to contact     Please call your clinic at [phone] to:    Ask questions about your health    Make or cancel appointments    Discuss your medicines    Learn about your test results    Speak to your doctor            Additional Information About Your Visit        Care EveryWhere ID     This is your Care EveryWhere ID. This could be used by other organizations to access your [facility] medical records  [id number]        Your Vitals Were     Height BMI (Body Mass Index)                1.87 m (6' 1.62\") 32.56 kg/m2           Blood Pressure from Last 3 Encounters:   12/04/18 132/87   09/26/18 134/84    Weight from Last 3 Encounters:   12/04/18 113.9 kg (251 lb)   12/04/18 114.3 kg (251 lb 14.4 oz)   10/08/18 103.9 kg (229 lb)               Primary Care Provider    None Specified       No primary provider on file.        Equal Access to Services     REGINA CALL : bethany Russ, sheron babb. So [facility] [phone].    ATENCIÓN: Si habla español, tiene a hood disposición " servicios gratuitos de asistencia lingüística. Mickie berry [phone].    We comply with applicable federal civil rights laws and [place] laws. We do not discriminate on the basis of race, color, national origin, age, disability, sex, sexual orientation, or gender identity.            Thank you!     Thank you for choosing [facility]  for your care. Our goal is always to provide you with excellent care. Hearing back from our patients is one way we can continue to improve our services. Please take a few minutes to complete the written survey that you may receive in the mail after your visit with us. Thank you!             Your Updated Medication List - Protect others around you: Learn how to safely use, store and throw away your medicines at www.disposemymeds.org.          This list is accurate as of 12/4/18  8:10 AM.  Always use your most recent med list.                   Brand Name Dispense Instructions for use Diagnosis    fluticasone 50 MCG/ACT nasal spray    FLONASE     Spray 2 sprays in nostril        ibuprofen 600 MG tablet    ADVIL/MOTRIN     Take 600 mg by mouth        nicotine 2 MG gum    [employer] NICOTINE    30 tablet    Place 1 each (2 mg) inside cheek as needed for smoking cessation    Cigarette nicotine dependence without complication

## 2018-12-04 NOTE — NURSING NOTE
"Reason For Visit:   Chief Complaint   Patient presents with     Consult     Hallux valgus Left foot       Ht 1.87 m (6' 1.62\")  Wt 114.3 kg (251 lb 14.4 oz)  BMI 32.68 kg/m2    Pain Assessment  Patient Currently in Pain: Jermaineies    Renetta Holley ATC    "

## 2018-12-04 NOTE — LETTER
12/4/2018       RE: Carrington Johnson  [address] [address]     Dear Colleague,    Thank you for referring your patient, Carrington Johnson, to the [facility] CLINIC at [facility]. Please see a copy of my visit note below.          SPORTS & ORTHOPEDIC WALK-IN VISIT 12/4/2018    Primary Care Physician:      Low back pain and neck / trap pain. He notes that he has pain in his back due to sitting a lot. His shoulder/trap pain is more intense than his low back pain. Pain starts in the base of the skull into his shoulders bilaterally. He notes he was in [facility] time for about year and got out in September. He has pain pretty constantly and gets more intense with certain movements. Stretching and ibuprofen helps pain.     Reason for visit:     What part of your body is injured / painful? Neck pain/trap pain    What caused the injury /pain? No inciting event     How long ago did your injury occur or pain begin? several months ago    What are your most bothersome symptoms? Pain    How would you characterize your symptom?  aching and tight     What makes your symptoms better? Rest and Ibuprofen    What makes your symptoms worse? Movement    Have you been previously seen for this problem? No    Medical History:    Any recent changes to your medical history? No    Any new medication prescribed since last visit? No    Have you had surgery on this body part before? No    Social History:    Occupation:     Handedness: Right    Exercise: None    Review of Systems:    Do you have fever, chills, weight loss? No    Do you have any vision problems? No    Do you have any chest pain or edema? No    Do you have any shortness of breath or wheezing?  No    Do you have stomach problems? No    Do you have any numbness or focal weakness? No    Do you have diabetes? No    Do you have problems with bleeding or clotting? No    Do you have an rashes or other skin  lesions? No           CHIEF COMPLAINT:  Neck pain     HISTORY OF PRESENT ILLNESS   is a pleasant 28 year old year old male who presents to clinic today with shoulder and neck pain.  Carrington explains that he was incarcerated over the past year and believes his firm mattress and prolonged sitting has contributed to neck and back pain.  Low back pain as well but this is not nearly as bothersome.  Would like to discuss cervical pain as priority today.    Denies numbness or tingling regularly but does endorse tingling from elbow to hand with use of phone for prolonged periods; elbow bent.  Unsure of fingers involved.    Onset: gradual  Location: bilateral shoulders and neck  Quality:  aching and tight  Duration: 4 months   Severity: moderate/10 at worst  Timing:intermittent episodes  Modifying factors:  resting and non-use makes it better, movement and use makes it worse  Associated signs & symptoms: None  Previous similar pain: No  Treatments to date: Rest and ibuprofen    Additional history: as documented    MEDICAL HISTORY  Patient Active Problem List   Diagnosis     Hallux valgus of left foot     Left foot pain       Current Outpatient Prescriptions   Medication Sig Dispense Refill     fluticasone (FLONASE) 50 MCG/ACT spray Spray 2 sprays in nostril       ibuprofen (ADVIL/MOTRIN) 600 MG tablet Take 600 mg by mouth       nicotine polacrilex ([employer] NICOTINE) 2 MG gum Place 1 each (2 mg) inside cheek as needed for smoking cessation (Patient not taking: Reported on 10/8/2018) 30 tablet 1       No Known Allergies    Family History   Problem Relation Age of Onset     No Known Problems Mother      Back Pain Father      slipped discs     No Known Problems Sister      No Known Problems Brother      not in contact with oldest sibling     Breast Cancer Maternal Grandmother      Ovarian Cancer Maternal Grandmother      No Known Problems Maternal Grandfather      Chronic Obstructive Pulmonary Disease Paternal Grandmother   "    Cancer Paternal Grandfather      No Known Problems Sister      No Known Problems Brother      Allergy (Severe) Son      Peanut allergy     Eczema Son        Additional medical/Social/Surgical histories reviewed in [facility] and updated as appropriate.     REVIEW OF SYSTEMS (12/4/2018)  CONSTITUTIONAL: Denies fever and weight loss  EYES: Denies acute vision changes  ENT: Denies hearing changes or difficulty swallowing  CARDIAC: Denies chest pain or edema  RESPIRATORY: Denies dyspnea, cough or wheeze  GASTROINTESTINAL: Denies abdominal pain, nausea, vomiting  MUSCULOSKELETAL: See HPI  SKIN: Denies any recent rash or lesion  NEUROLOGICAL: Denies numbness or focal weakness  PSYCHIATRIC: No history of psychiatric symptoms or problems  ENDOCRINE: Diagnosis of diabetes:No  HEMATOLOGY: Denies episodes of easy bleeding      PHYSICAL EXAM  /87  Ht 1.87 m (6' 1.62\")  Wt 113.9 kg (251 lb)  BMI 32.56 kg/m2    General  - normal appearance, in no obvious distress  CV  - normal peripheral perfusion  Pulm  - normal respiratory pattern, non-labored  Musculoskeletal - cervical spine  - inspection: normal bone and joint alignment, no obvious kyphosis  - palpation: paravertebral tenderness to palpation bilaterally, scalene tenderness, bilateral trapezius tenderness  - ROM: normal and painless flexion, mildly limited extension, left rotation and left side bending mildly limited. Right rotation minimal limitation.   - strength: upper extremities 5/5 in all planes  - special tests:  (-) Spurling bilaterally  (-) Tomi's reflex  Neuro  - C5-7 DTRs 2+ bilaterally, no sensory or motor deficit, grossly normal coordination, normal muscle tone  Skin  - no ecchymosis, erythema, warmth, or induration, no obvious rash  Psych  - interactive, appropriate, normal mood and affect    IMAGING : Deferred today     ASSESSMENT & PLAN   is a 28 year old year old male who presents to clinic today with persisting cervicalgia and shoulder " pain over the last 3-4 months.    Diagnosis: Acute cervicalgia    -Physical therapy referral  -Home exercise program  -Heat discussed  -Tennis ball massages  -Follow up 6 weeks    It was a pleasure seeing Carrington today.    Erlin Richards DO, [facility]  Primary Care Sports Medicine      Again, thank you for allowing me to participate in the care of your patient.      Sincerely,    Erlin Richards DO

## 2018-12-04 NOTE — PROGRESS NOTES
CHIEF COMPLAINT:  Left foot pain.      HISTORY OF PRESENT ILLNESS:   is a 28-year-old male who presents today for evaluation of his left foot.  The patient reports to have had some trauma to his left foot at the age of 5 where he apparently was hit by a door.  Eventually because of lack of insurance from his dad he reports to have treated this conservatively.  Over the course of the following years, he has developed a valgus deformity and now reports to have pain and discomfort.      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      He reports to have pain and discomfort along the medial aspect of the first MTP joint.  Reports also to have tried some shoe modifications without any significant success.      PAST MEDICAL HISTORY:  Back pain and substance abuse.      DRUG ALLERGIES:  None.      CURRENT MEDICATIONS:  Please refer to encounter form.      PHYSICAL EXAMINATION:  On today's visit, he presents as a pleasant male in no apparent distress with a height of 6 feet 1 inch and a weight of 251 pounds.  Denies to have any constitutional symptoms.      On today's visit, he presents with full range of motion of the left ankle, hindfoot and midfoot joints.  CMS intact.  Skin intact.  Presents with a semi-correctable bunion deformity with the absence of a hypermobile first ray.  There is some skin irritation along the medial aspect of the first metatarsal head.  Motion of the first MTP joint is approximately a combined 40 degrees of plantar and dorsiflexion.      RADIOGRAPHIC EVALUATION:  Plain x-rays were reviewed today which were significant for showing a noncongruent bunion deformity with a relatively well-preserved joint space.  Otherwise, x-rays are unremarkable.      ASSESSMENT:  Posttraumatic bunion deformity, left foot.      PLAN:  I discussed with patient that at this point the options  are to proceed with a first metatarsal osteotomy versus a first MTP joint fusion.  Given the history that he presents, I would suggest to proceed with a fusion as I believe he will be a much more predictable procedure with a very good functional outcome.      I discussed with the patient the pros and cons of the procedure as well as the most likely postoperative course.  For the time being, he would like to do some thinking and he will contact us if he wishes to proceed with surgery, which will require another visit in the clinic.      On today's visit, also we made arrangements for him to have his back evaluated by our walk-in clinic providers.      All questions were answered.  The patient was pleased with the discussion.      TT 30 minutes, CT 20 minutes.

## 2018-12-04 NOTE — PATIENT INSTRUCTIONS
CERVICAL (Neck) STRAIN    WHAT IS NECK STRAIN?    A neck strain is a stretch or tear of a muscle in your neck.    WHAT IS THE CAUSE?    Neck strains usually happen when the head and neck are hit or forcibly moved, such as during rough contact sports or a whiplash injury (like a car accident or a fall) where your head and neck are snapped back and forth. Sometimes strains happen from an awkward position during sleep or poor posture while you work at a desk.    WHAT ARE THE SYMPTOMS?    The main symptom is pain in your neck. You may have pain when you move your head to the side or when you try to move your head up or down. The neck muscles may tighten (spasm) and feel hard and very tender to the touch. You may feel pain right after an injury or not until a few hours or days after the injury.    Other symptoms may include:    Stiff neck  Dizziness  Unusual feelings, like burning or a pins-and-needles feeling  Headache    HOW IS IT DIAGNOSED?    Your healthcare provider will ask about your symptoms, medical history, and activities and examine your neck. You may have X-rays to make sure the bones in your neck (vertebrae) are not injured.    HOW IS IT TREATED?    Your healthcare provider may recommend stretching and strengthening exercises and other types of physical therapy to help you heal.    The pain often gets better within a few weeks with self-care, but some injuries may take longer to heal. It s important to follow all of your healthcare provider s instructions.    HOW CAN I HELP TAKE CARE OF MYSELF?    To help relieve pain:    Put an ice pack, gel pack, or package of frozen vegetables wrapped in a cloth on the injured area every 3 to 4 hours for up to 20 minutes at a time for the first day or two after the injury.    Take nonprescription pain medicine, such as acetaminophen, ibuprofen, or naproxen. Read the label and take as directed. Nonsteroidal anti-inflammatory medicines (NSAIDs), such as ibuprofen or  naproxen, may cause stomach bleeding and other problems. These risks increase with age. Unless recommended by your healthcare provider, do not take an NSAID for more than 10 days.    Moist heat may help relax your muscles and make it easier to move your neck. Put moist heat on the sore area for 10 to 15 minutes before you do warm-up and stretching exercises. Moist heat includes heat patches or moist heating pads that you can purchase at most [employer], a wet washcloth or towel that has been heated in a microwave or the dryer, or a hot shower. Don t use heat if you have swelling.    You may find that it helps to alternate putting heat and ice on your neck.    HOW CAN I HELP PREVENT NECK STRAIN?    Neck strain may be prevented by keeping your neck muscles strong and flexible. Ask your healthcare provider about stretching and exercises that may help. If you have a job or hobby that requires you to hold your neck in one position for long hours, like working at a computer all day, it s very important to take breaks and stretch your neck muscles.    Developed by [employer].  Published by [employer].  Copyright  2014 [employer] and/or one of its subsidiaries. All rights reserved.      Myofascial Release    Tennis balls can provide myofascial release through myofascial therapy. You can perform self-myofascial release by placing a tennis ball on the tender area and leaning against a wall or the floor to apply light pressure until you feel the release. Or you can put two tennis balls in a sock, leaving a small space between the balls, and tying the open end closed. Lie on the floor and put the tennis balls behind your neck, one on either side of your spine. Press your neck against the tennis balls until you feel the release. Do not press hard enough to cause pain.    Neck Spasm Exercises    You may do all of these exercises right away but avoid any movements that increase your pain.    Neck rotation with  flexion  Right side: Turn your head to the right and clasp your hands behind your head. Let the weight of your arms pull your chin to the right side of your chest. Relax. Hold for a count of 15. Do this 3 times.    Left side: Turn your head to the left and clasp your hands behind your head. Let the weight of your arms pull your chin to the left side of your chest. Relax. Hold for a count of 15. Do this 3 times.    Chin tuck: Place your fingertips on your chin and gently push your head straight back as if you are trying to make a double chin. Keep looking forward as your head moves back. Hold 5 seconds and repeat 5 times.  Scalene stretch: Sit or stand and clasp both hands behind your back. Lower your left shoulder and tilt your head toward the right until you feel a stretch. Hold this position for 15 to 30 seconds and then come back to the starting position. Then lower your right shoulder and tilt your head toward the left. Hold for 15 to 30 seconds. Repeat 3 times on each side.  Neck rotation stretch    Right side: Rotate your neck by looking over your right shoulder. Lift your right hand and place your palm on the left side of your chin. Push your chin with your palm toward your right shoulder. Hold for a count of 10. Do this 3 times.    Left side: Rotate your neck by looking over your left shoulder. Lift your left hand and place your palm on the right side of your chin. Push your chin with your palm toward your left shoulder. Hold for a count of 10. Do this 3 times.    Scapular squeeze: While sitting or standing with your arms by your sides, squeeze your shoulder blades together and hold for 5 seconds. Do 2 sets of 15.  Thoracic extension: Sit in a chair and clasp both arms behind your head. Gently arch backward and look up toward the ceiling. Repeat 10 times. Do this several times each day.    Head lift with neck curl: Lie on your back with your knees bent and your feet flat on the floor. Tuck your chin and lift  your head about 3 inches off the floor, keeping your shoulders flat on the floor. Hold for 10 seconds. Repeat 5 times. Try to work up to holding for 20 to 30 seconds.

## 2018-12-04 NOTE — PROGRESS NOTES
SPORTS & ORTHOPEDIC WALK-IN VISIT 12/4/2018    Primary Care Physician:      Low back pain and neck / trap pain. He notes that he has pain in his back due to sitting a lot. His shoulder/trap pain is more intense than his low back pain. Pain starts in the base of the skull into his shoulders bilaterally. He notes he was in long-term time for about year and got out in September. He has pain pretty constantly and gets more intense with certain movements. Stretching and ibuprofen helps pain.     Reason for visit:     What part of your body is injured / painful? Neck pain/trap pain    What caused the injury /pain? No inciting event     How long ago did your injury occur or pain begin? several months ago    What are your most bothersome symptoms? Pain    How would you characterize your symptom?  aching and tight     What makes your symptoms better? Rest and Ibuprofen    What makes your symptoms worse? Movement    Have you been previously seen for this problem? No    Medical History:    Any recent changes to your medical history? No    Any new medication prescribed since last visit? No    Have you had surgery on this body part before? No    Social History:    Occupation:     Handedness: Right    Exercise: None    Review of Systems:    Do you have fever, chills, weight loss? No    Do you have any vision problems? No    Do you have any chest pain or edema? No    Do you have any shortness of breath or wheezing?  No    Do you have stomach problems? No    Do you have any numbness or focal weakness? No    Do you have diabetes? No    Do you have problems with bleeding or clotting? No    Do you have an rashes or other skin lesions? No

## 2018-12-04 NOTE — MR AVS SNAPSHOT
"              After Visit Summary   12/4/2018    Carrington Johnson    MRN: [id number]           Patient Information     Date Of Birth          1990        Visit Information        Provider Department      12/4/2018 7:20 AM Calixto Liz [facility]        Today's Diagnoses     Pain in joint, ankle and foot, left    -  1       Follow-ups after your visit        Who to contact     Please call your clinic at [phone] to:    Ask questions about your health    Make or cancel appointments    Discuss your medicines    Learn about your test results    Speak to your doctor            Additional Information About Your Visit        Care EveryWhere ID     This is your Care EveryWhere ID. This could be used by other organizations to access your [place] medical records  [id number]        Your Vitals Were     Height BMI (Body Mass Index)                1.87 m (6' 1.62\") 32.68 kg/m2           Blood Pressure from Last 3 Encounters:   12/04/18 132/87   09/26/18 134/84    Weight from Last 3 Encounters:   12/04/18 113.9 kg (251 lb)   12/04/18 114.3 kg (251 lb 14.4 oz)   10/08/18 103.9 kg (229 lb)              Today, you had the following     No orders found for display       Primary Care Provider    None Specified       No primary provider on file.        Equal Access to Services     [place] CALL : Shanice Chappell, jacobda lilliam, qagonzálezta kaalmada luis, sheron li . So [facility] [phone].    ATENCIÓN: Si habla español, tiene a hood disposición servicios gratuitos de asistencia lingüística. Llame al [phone].    We comply with applicable federal civil rights laws and [place] laws. We do not discriminate on the basis of race, color, national origin, age, disability, sex, sexual orientation, or gender identity.            Thank you!     Thank you for choosing [facility]  for your care. Our goal is always to provide you with excellent care. " Hearing back from our patients is one way we can continue to improve our services. Please take a few minutes to complete the written survey that you may receive in the mail after your visit with us. Thank you!             Your Updated Medication List - Protect others around you: Learn how to safely use, store and throw away your medicines at www.disposemymeds.org.          This list is accurate as of 12/4/18 11:59 PM.  Always use your most recent med list.                   Brand Name Dispense Instructions for use Diagnosis    fluticasone 50 MCG/ACT nasal spray    FLONASE     Spray 2 sprays in nostril        ibuprofen 600 MG tablet    ADVIL/MOTRIN     Take 600 mg by mouth        nicotine 2 MG gum    [facility] NICOTINE    30 tablet    Place 1 each (2 mg) inside cheek as needed for smoking cessation    Cigarette nicotine dependence without complication

## 2018-12-04 NOTE — LETTER
Date:December 5, 2018      Patient was self referred, no letter generated. Do not send.        [facility] Health Information
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
## 2018-12-04 NOTE — PROGRESS NOTES
CHIEF COMPLAINT:  Neck pain     HISTORY OF PRESENT ILLNESS   is a pleasant 28 year old year old male who presents to clinic today with shoulder and neck pain.  Carrington explains that he was incarcerated over the past year and believes his firm mattress and prolonged sitting has contributed to neck and back pain.  Low back pain as well but this is not nearly as bothersome.  Would like to discuss cervical pain as priority today.    Denies numbness or tingling regularly but does endorse tingling from elbow to hand with use of phone for prolonged periods; elbow bent.  Unsure of fingers involved.    Onset: gradual  Location: bilateral shoulders and neck  Quality:  aching and tight  Duration: 4 months   Severity: moderate/10 at worst  Timing:intermittent episodes  Modifying factors:  resting and non-use makes it better, movement and use makes it worse  Associated signs & symptoms: None  Previous similar pain: No  Treatments to date: Rest and ibuprofen    Additional history: as documented    MEDICAL HISTORY  Patient Active Problem List   Diagnosis     Hallux valgus of left foot     Left foot pain       Current Outpatient Prescriptions   Medication Sig Dispense Refill     fluticasone (FLONASE) 50 MCG/ACT spray Spray 2 sprays in nostril       ibuprofen (ADVIL/MOTRIN) 600 MG tablet Take 600 mg by mouth       nicotine polacrilex ([employer] NICOTINE) 2 MG gum Place 1 each (2 mg) inside cheek as needed for smoking cessation (Patient not taking: Reported on 10/8/2018) 30 tablet 1       No Known Allergies    Family History   Problem Relation Age of Onset     No Known Problems Mother      Back Pain Father      slipped discs     No Known Problems Sister      No Known Problems Brother      not in contact with oldest sibling     Breast Cancer Maternal Grandmother      Ovarian Cancer Maternal Grandmother      No Known Problems Maternal Grandfather      Chronic Obstructive Pulmonary Disease Paternal Grandmother      Cancer Paternal  "Grandfather      No Known Problems Sister      No Known Problems Brother      Allergy (Severe) Son      Peanut allergy     Eczema Son        Additional medical/Social/Surgical histories reviewed in [facility] and updated as appropriate.     REVIEW OF SYSTEMS (12/4/2018)  CONSTITUTIONAL: Denies fever and weight loss  EYES: Denies acute vision changes  ENT: Denies hearing changes or difficulty swallowing  CARDIAC: Denies chest pain or edema  RESPIRATORY: Denies dyspnea, cough or wheeze  GASTROINTESTINAL: Denies abdominal pain, nausea, vomiting  MUSCULOSKELETAL: See HPI  SKIN: Denies any recent rash or lesion  NEUROLOGICAL: Denies numbness or focal weakness  PSYCHIATRIC: No history of psychiatric symptoms or problems  ENDOCRINE: Diagnosis of diabetes:No  HEMATOLOGY: Denies episodes of easy bleeding      PHYSICAL EXAM  /87  Ht 1.87 m (6' 1.62\")  Wt 113.9 kg (251 lb)  BMI 32.56 kg/m2    General  - normal appearance, in no obvious distress  CV  - normal peripheral perfusion  Pulm  - normal respiratory pattern, non-labored  Musculoskeletal - cervical spine  - inspection: normal bone and joint alignment, no obvious kyphosis  - palpation: paravertebral tenderness to palpation bilaterally, scalene tenderness, bilateral trapezius tenderness  - ROM: normal and painless flexion, mildly limited extension, left rotation and left side bending mildly limited. Right rotation minimal limitation.   - strength: upper extremities 5/5 in all planes  - special tests:  (-) Spurling bilaterally  (-) Tomi's reflex  Neuro  - C5-7 DTRs 2+ bilaterally, no sensory or motor deficit, grossly normal coordination, normal muscle tone  Skin  - no ecchymosis, erythema, warmth, or induration, no obvious rash  Psych  - interactive, appropriate, normal mood and affect    IMAGING : Deferred today     ASSESSMENT & PLAN   is a 28 year old year old male who presents to clinic today with persisting cervicalgia and shoulder pain over the last " 3-4 months.    Diagnosis: Acute cervicalgia    -Physical therapy referral  -Home exercise program  -Heat discussed  -Tennis ball massages  -Follow up 6 weeks    It was a pleasure seeing Carrington today.    Erlin Richards DO, CAQSM  Primary Care Sports Medicine

## 2018-12-20 ENCOUNTER — THERAPY VISIT (OUTPATIENT)
Dept: PHYSICAL THERAPY | Facility: CLINIC | Age: 28
End: 2018-12-20
Attending: FAMILY MEDICINE
Payer: COMMERCIAL

## 2018-12-20 DIAGNOSIS — M25.511 CHRONIC PAIN OF BOTH SHOULDERS: ICD-10-CM

## 2018-12-20 DIAGNOSIS — M54.2 NECK PAIN: ICD-10-CM

## 2018-12-20 DIAGNOSIS — G89.29 CHRONIC PAIN OF BOTH SHOULDERS: ICD-10-CM

## 2018-12-20 DIAGNOSIS — M54.2 CERVICALGIA: ICD-10-CM

## 2018-12-20 DIAGNOSIS — M25.512 CHRONIC PAIN OF BOTH SHOULDERS: ICD-10-CM

## 2018-12-20 PROCEDURE — 97110 THERAPEUTIC EXERCISES: CPT | Mod: GP | Performed by: PHYSICAL THERAPIST

## 2018-12-20 PROCEDURE — 97161 PT EVAL LOW COMPLEX 20 MIN: CPT | Mod: GP | Performed by: PHYSICAL THERAPIST

## 2018-12-20 NOTE — PROGRESS NOTES
[facility] Initial Evaluation  Subjective:  [facility] Initial Evaluation                              Objective:  System    Physical Exam    General     ROS    Assessment/Plan:    {REHAB NOTES:852139}
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
## 2018-12-24 PROBLEM — M25.512 SHOULDER PAIN, BILATERAL: Status: ACTIVE | Noted: 2018-12-24

## 2018-12-24 PROBLEM — M54.2 NECK PAIN: Status: ACTIVE | Noted: 2018-12-24

## 2018-12-24 PROBLEM — M25.511 SHOULDER PAIN, BILATERAL: Status: ACTIVE | Noted: 2018-12-24

## 2019-01-09 ENCOUNTER — OFFICE VISIT (OUTPATIENT)
Dept: FAMILY MEDICINE | Facility: CLINIC | Age: 29
End: 2019-01-09
Payer: COMMERCIAL

## 2019-01-09 VITALS
SYSTOLIC BLOOD PRESSURE: 144 MMHG | RESPIRATION RATE: 16 BRPM | DIASTOLIC BLOOD PRESSURE: 75 MMHG | HEART RATE: 79 BPM | WEIGHT: 251 LBS | TEMPERATURE: 98.9 F | OXYGEN SATURATION: 96 % | BODY MASS INDEX: 32.56 KG/M2

## 2019-01-09 DIAGNOSIS — Z30.8 SURVEILLANCE FOR BIRTH CONTROL, VASECTOMY: Primary | ICD-10-CM

## 2019-01-09 NOTE — PROGRESS NOTES
HPI       Carrington Johnson is a 28 year old who presents for     Chief Complaint   Patient presents with     Referral     Urology Referral     Carrington is requesting a referral today to have a vasectomy completed. States he doesn't want any more children. He currently has two of his own children (ages 2 and 6) and five step children (ages 7,11,13,14,14). His fiance also does not want any more children, however patient states she is unwilling to have a tubal ligation performed.     Carrington is currently living at [facility] dependency treatment [facility], he was admitted 8/17/18. He plans to move to [place] following his graduation from the program. He will likely be moving in a couple of weeks and desires to have the procedure after relocating to [place]. He requests the referral to be in the [facility] as he plans to commute from [place] to have the procedure completed.       Past Medical History:   Diagnosis Date     Substance abuse (H)      Current Outpatient Medications   Medication     fluticasone (FLONASE) 50 MCG/ACT spray     ibuprofen (ADVIL/MOTRIN) 600 MG tablet     nicotine polacrilex ([facility] NICOTINE) 2 MG gum     No current facility-administered medications for this visit.       No Known Allergies      Problem, Medication and Allergy Lists were reviewed and updated if needed..    Patient is an established patient of this clinic..         Review of Systems:   Review of Systems     ROS: 10 point ROS neg other than the symptoms noted above in the HPI.         Physical Exam:     Vitals:    01/09/19 1332   BP: 144/75   BP Location: Right arm   Patient Position: Sitting   Cuff Size: Adult Regular   Pulse: 79   Resp: 16   Temp: 98.9  F (37.2  C)   TempSrc: Oral   SpO2: 96%   Weight: 113.9 kg (251 lb)     Body mass index is 32.56 kg/m .  Vital signs normal except elevated systolic blood pressure.     Physical Exam   Constitutional: He is oriented to person, place, and time. He appears well-developed and  well-nourished. No distress.   Cardiovascular: Normal rate.   Pulmonary/Chest: Effort normal. No respiratory distress.   Neurological: He is alert and oriented to person, place, and time.   Skin: Skin is warm and dry.   Psychiatric: He has a normal mood and affect. His behavior is normal.       Results:     No laboratory testing was completed at today's visit.    Assessment and Plan      1. Surveillance for birth control, vasectomy  Comment: Pt requesting referral for vasectomy.   Plan:   - UROLOGY ADULT REFERRAL   - Communicated scheduling information to nursing staff at [facility]    Follow-up: As needed.     There are no discontinued medications.    Options for treatment and follow-up care were reviewed with the patient. Carrington Johnson  engaged in the decision making process and verbalized understanding of the options discussed and agreed with the final plan.    LUDWIG Coe CNP

## 2019-01-15 ENCOUNTER — THERAPY VISIT (OUTPATIENT)
Dept: PHYSICAL THERAPY | Facility: CLINIC | Age: 29
End: 2019-01-15
Payer: COMMERCIAL

## 2019-01-15 DIAGNOSIS — M25.512 CHRONIC PAIN OF BOTH SHOULDERS: ICD-10-CM

## 2019-01-15 DIAGNOSIS — M54.2 NECK PAIN: ICD-10-CM

## 2019-01-15 DIAGNOSIS — M25.511 CHRONIC PAIN OF BOTH SHOULDERS: ICD-10-CM

## 2019-01-15 DIAGNOSIS — G89.29 CHRONIC PAIN OF BOTH SHOULDERS: ICD-10-CM

## 2019-01-15 PROCEDURE — 97110 THERAPEUTIC EXERCISES: CPT | Mod: GP | Performed by: PHYSICAL THERAPIST

## 2019-01-15 PROCEDURE — 97112 NEUROMUSCULAR REEDUCATION: CPT | Mod: GP | Performed by: PHYSICAL THERAPIST

## 2019-04-15 PROBLEM — M25.512 SHOULDER PAIN, BILATERAL: Status: RESOLVED | Noted: 2018-12-24 | Resolved: 2019-04-15

## 2019-04-15 PROBLEM — M25.511 SHOULDER PAIN, BILATERAL: Status: RESOLVED | Noted: 2018-12-24 | Resolved: 2019-04-15

## 2019-04-15 PROBLEM — M54.2 NECK PAIN: Status: RESOLVED | Noted: 2018-12-24 | Resolved: 2019-04-15

## 2019-04-15 NOTE — PROGRESS NOTES
Carringtonlo Johnson was seen 2 times for evaluation and treatment.  Patient did not return for further treatment and current status is unknown.  Due to short treatment duration, no objective or functional changes were made.  Please see goal flow sheet from episode noted date below and initial evaluation for further information.  No linked episodes